# Patient Record
Sex: FEMALE | Race: WHITE
[De-identification: names, ages, dates, MRNs, and addresses within clinical notes are randomized per-mention and may not be internally consistent; named-entity substitution may affect disease eponyms.]

---

## 2020-04-24 ENCOUNTER — HOSPITAL ENCOUNTER (EMERGENCY)
Dept: HOSPITAL 97 - ER | Age: 40
Discharge: HOME | End: 2020-04-24
Payer: COMMERCIAL

## 2020-04-24 VITALS — TEMPERATURE: 98.4 F

## 2020-04-24 VITALS — SYSTOLIC BLOOD PRESSURE: 141 MMHG | DIASTOLIC BLOOD PRESSURE: 89 MMHG | OXYGEN SATURATION: 98 %

## 2020-04-24 DIAGNOSIS — I10: Primary | ICD-10-CM

## 2020-04-24 LAB
BUN BLD-MCNC: 13 MG/DL (ref 7–18)
GLUCOSE SERPLBLD-MCNC: 95 MG/DL (ref 74–106)
HCT VFR BLD CALC: 36.9 % (ref 36–45)
LYMPHOCYTES # SPEC AUTO: 1.9 K/UL (ref 0.7–4.9)
PMV BLD: 8.4 FL (ref 7.6–11.3)
POTASSIUM SERPL-SCNC: 3.3 MMOL/L (ref 3.5–5.1)
RBC # BLD: 4.65 M/UL (ref 3.86–4.86)

## 2020-04-24 PROCEDURE — 81025 URINE PREGNANCY TEST: CPT

## 2020-04-24 PROCEDURE — 93005 ELECTROCARDIOGRAM TRACING: CPT

## 2020-04-24 PROCEDURE — 85025 COMPLETE CBC W/AUTO DIFF WBC: CPT

## 2020-04-24 PROCEDURE — 99284 EMERGENCY DEPT VISIT MOD MDM: CPT

## 2020-04-24 PROCEDURE — 80048 BASIC METABOLIC PNL TOTAL CA: CPT

## 2020-04-24 PROCEDURE — 81003 URINALYSIS AUTO W/O SCOPE: CPT

## 2020-04-24 PROCEDURE — 36415 COLL VENOUS BLD VENIPUNCTURE: CPT

## 2020-04-24 NOTE — EDPHYS
Physician Documentation                                                                           

 [facility]                                                                 

Name: Nicolle Chaidez                                                                         

Age: 40 yrs                                                                                       

Sex: Female                                                                                       

: 1980                                                                                   

MRN: [id number]                                                                                   

Arrival Date: 2020                                                                          

Time: 13:18                                                                                       

Account#: [id number]                                                                            

Bed 17                                                                                            

Private MD: KYE ENCARNACION                                                                        

ED Physician Papa Cota                                                                         

HPI:                                                                                              

                                                                                             

14:18 This 40 yrs old  Female presents to ER via Ambulatory with complaints of High  jr8 

      Blood Pressure.                                                                             

14:18 The patient has elevated blood pressure and discovered this at work. Onset: The         jr8 

      symptoms/episode began/occurred acutely, today. Modifying factors: The symptoms are         

      alleviated by remaining still. Associated signs and symptoms: Pertinent positives:          

      headache. Severity of symptoms: At its worst the blood pressure was moderate, in the        

      emergency department the blood pressure is unchanged. The patient has not experienced       

      similar symptoms in the past. The patient has not recently seen a physician. Patient        

      stated that she was having headache that started yesterday. Different then previous         

      ones in past. Stated that she continued to have one today. While at work had BP checked     

      and was in the 180s. Came to ED for evaluation at that time .                               

                                                                                                  

Historical:                                                                                       

- Allergies:                                                                                      

13:35 No Known Allergies;                                                                     bp  

- Home Meds:                                                                                      

13:35 BIRTH CONTROL PILLS [Active];                                                           bp  

- PMHx:                                                                                           

13:35 PCOS; Anemia;                                                                           bp  

- PSHx:                                                                                           

13:35 ;                                                                              bp  

                                                                                                  

- Immunization history:: Adult Immunizations up to date.                                          

- Social history:: Smoking status: Patient denies any tobacco usage or history of.                

                                                                                                  

                                                                                                  

ROS:                                                                                              

14:18 Eyes: Negative for injury, pain, redness, and discharge, ENT: Negative for injury,      jr8 

      pain, and discharge, Neck: Negative for injury, pain, and swelling, Cardiovascular:         

      Negative for chest pain, palpitations, and edema, Respiratory: Negative for shortness       

      of breath, cough, wheezing, and pleuritic chest pain, Abdomen/GI: Negative for              

      abdominal pain, nausea, vomiting, diarrhea, and constipation, Back: Negative for injury     

      and pain, MS/Extremity: Negative for injury and deformity, Skin: Negative for injury,       

      rash, and discoloration.                                                                    

14:18 Neuro: Positive for headache.                                                               

                                                                                                  

Exam:                                                                                             

14:18 Eyes:  Pupils equal round and reactive to light, extra-ocular motions intact.  Lids and jr8 

      lashes normal.  Conjunctiva and sclera are non-icteric and not injected.  Cornea within     

      normal limits.  Periorbital areas with no swelling, redness, or edema. ENT:  Nares          

      patent. No nasal discharge, no septal abnormalities noted.  Tympanic membranes are          

      normal and external auditory canals are clear.  Oropharynx with no redness, swelling,       

      or masses, exudates, or evidence of obstruction, uvula midline.  Mucous membranes           

      moist. Neck:  Trachea midline, no thyromegaly or masses palpated, and no cervical           

      lymphadenopathy.  Supple, full range of motion without nuchal rigidity, or vertebral        

      point tenderness.  No Meningismus. Cardiovascular:  Regular rate and rhythm with a          

      normal S1 and S2.  No gallops, murmurs, or rubs.  Normal PMI, no JVD.  No pulse             

      deficits. Respiratory:  Lungs have equal breath sounds bilaterally, clear to                

      auscultation and percussion.  No rales, rhonchi or wheezes noted.  No increased work of     

      breathing, no retractions or nasal flaring. Abdomen/GI:  Soft, non-tender, with normal      

      bowel sounds.  No distension or tympany.  No guarding or rebound.  No evidence of           

      tenderness throughout. Back:  No spinal tenderness.  No costovertebral tenderness.          

      Full range of motion. Skin:  Warm, dry with normal turgor.  Normal color with no            

      rashes, no lesions, and no evidence of cellulitis. MS/ Extremity:  Pulses equal, no         

      cyanosis.  Neurovascular intact.  Full, normal range of motion. Neuro:  Awake and           

      alert, GCS 15, oriented to person, place, time, and situation.  Cranial nerves II-XII       

      grossly intact.  Motor strength 5/5 in all extremities.  Sensory grossly intact.            

      Cerebellar exam normal.  Normal gait.                                                       

14:18 ECG was reviewed by the Attending Physician.                                                

                                                                                                  

Vital Signs:                                                                                      

13:27  / 95 LA (auto/reg); Pulse 88; Resp 18; Temp 98.4(TE); Pulse Ox 98% on R/A;       jp3 

      Weight 92.53 kg (R); Height 5 ft. 7 in. (170.18 cm) (R); Pain 3/10;                         

14:30  / 93; Pulse 76; Resp 16; Pulse Ox 97% ;                                          bp  

15:05  / 89; Pulse 80; Resp 16; Pulse Ox 98% ;                                          bp  

13:27 Body Mass Index 31.95 (92.53 kg, 170.18 cm)                                             jp3 

                                                                                                  

MDM:                                                                                              

13:31 Patient medically screened.                                                             jr8 

14:58 Data reviewed: vital signs, nurses notes, lab test result(s), EKG, and as a result, I   jr8 

      will discharge patient. Data interpreted: Pulse oximetry: on room air is 98 %.              

      Interpretation: normal. Counseling: I had a detailed discussion with the patient and/or     

      guardian regarding: the historical points, exam findings, and any diagnostic results        

      supporting the discharge/admit diagnosis, lab results, the need for outpatient follow       

      up, a family practitioner, to return to the emergency department if symptoms worsen or      

      persist or if there are any questions or concerns that arise at home.                       

                                                                                                  

                                                                                             

14:10 Order name: CBC with Diff; Complete Time: 14:58                                         [facility] 

                                                                                             

14:10 Order name: Basic Metabolic Panel; Complete Time: 14:58                                 [facility] 

                                                                                             

13:43 Order name: EKG - Nurse/Tech; Complete Time: 14:21                                      [facility] 

                                                                                             

13:43 Order name: EKG; Complete Time: 13:44                                                   [facility] 

                                                                                             

14:16 Order name: Urine Dipstick--Ancillary (enter results); Complete Time: 14:36               

                                                                                             

14:16 Order name: Urine Pregnancy--Ancillary (enter results); Complete Time: 14:36            eb  

                                                                                             

13:43 Order name: Urine Pregnancy Test (obtain specimen); Complete Time: 14:21                8 

                                                                                             

13:43 Order name: Urine Dipstick-Ancillary (obtain specimen); Complete Time: 14:21             

                                                                                                  

EC:18 Rate is 77 beats/min. Rhythm is regular, Normal Sinus Rhythm. QRS Axis is Normal. [place]    jr8 

      interval is normal at 162 msec. QRS interval is normal at 90 msec. QT interval is           

      prolonged at 491 msec. No Q waves. T waves are Normal. No ST changes noted. Clinical        

      impression: No evidence of ischemia and QT Prolongation. Interpreted by me. Reviewed by     

      me.                                                                                         

                                                                                                  

Administered Medications:                                                                         

No medications were administered                                                                  

                                                                                                  

                                                                                                  

Disposition:                                                                                      

15:27 Co-signature as Attending Physician, Papa Cota DO I agree with the assessment and     ms3 

      plan of care. Attestation: The patient's history, exam findings, diagnostics, and a         

      summary of any interventions or procedures was reviewed in detail with Zurdo PURCELL.      

                                                                                                  

Disposition:                                                                                      

20 14:58 Discharged to Home. Impression: Essential (primary) hypertension.                  

- Condition is Stable.                                                                            

- Discharge Instructions: Hypertension.                                                           

- Prescriptions for Lisinopril 10 mg Oral Tablet - take 1 tablet by ORAL route once               

  daily; 20 tablet.                                                                               

- Medication Reconciliation Form, Thank You Letter, Antibiotic Education, Prescription            

  Opioid Use form.                                                                                

- Follow up: Private Physician; When: 5 - 6 days; Reason: Recheck today's complaints,             

  Continuance of care, Re-evaluation by your physician.                                           

- Problem is new.                                                                                 

- Symptoms have improved.                                                                         

                                                                                                  

                                                                                                  

                                                                                                  

Signatures:                                                                                       

Dispatcher [employer]                           EDMS                                                 

Zurdo Trammell PA PA   jr8                                                  

Elder Dutta, RN                      RN   bp                                                   

Papa Cota DO                        DO   ms3                                                  

                                                                                                  

Corrections: (The following items were deleted from the chart)                                    

15:07 14:58 2020 14:58 Discharged to Home. Impression: Essential (primary)              bp  

      hypertension. Condition is Stable. Forms are Medication Reconciliation Form, Thank You      

      Letter, Antibiotic Education, Prescription Opioid Use. Follow up: Private Physician;        

      When: 5 - 6 days; Reason: Recheck today's complaints, Continuance of care,                  

      Re-evaluation by your physician. Problem is new. Symptoms have improved. jr8                

                                                                                                  

**************************************************************************************************

## 2020-04-24 NOTE — ER
Nurse's Notes                                                                                     

 [facility]                                                                 

Name: Nicolle Chaidez                                                                         

Age: 40 yrs                                                                                       

Sex: Female                                                                                       

: 1980                                                                                   

MRN: [id number]                                                                                   

Arrival Date: 2020                                                                          

Time: 13:18                                                                                       

Account#: [id number]                                                                            

Bed 17                                                                                            

Private MD: KYE ENCARNACION                                                                        

Diagnosis: Essential (primary) hypertension                                                       

                                                                                                  

Presentation:                                                                                     

                                                                                             

13:27 Chief complaint: Patient states: HTN WITH NO H/O HTN. Coronavirus screen: Proceed with  bp  

      normal triage. Ebola Screen: No symptoms or risks identified at this time. Initial          

      Sepsis Screen: Does the patient meet any 2 criteria? No. Patient's initial sepsis           

      screen is negative. Does the patient have a suspected source of infection? No.              

      Patient's initial sepsis screen is negative. Risk Assessment: Do you want to hurt           

      yourself or someone else? Patient reports no desire to harm self or others. Onset of        

      symptoms was 2020.                                                                

13:27 Method Of Arrival: Ambulatory                                                           bp  

13:27 Acuity: HENRY 4                                                                           bp  

                                                                                                  

Triage Assessment:                                                                                

13:30 General: Appears in no apparent distress. comfortable, Behavior is cooperative,         bp  

      appropriate for age, anxious. Pain: Denies pain. EENT: No deficits noted. Neuro: No         

      deficits noted. Cardiovascular: No deficits noted. Respiratory: No deficits noted. GI:      

      No signs and/or symptoms were reported involving the gastrointestinal system. : No        

      signs and/or symptoms were reported regarding the genitourinary system. Derm: No            

      deficits noted. Musculoskeletal: No deficits noted.                                         

                                                                                                  

Historical:                                                                                       

- Allergies:                                                                                      

13:35 No Known Allergies;                                                                     bp  

- Home Meds:                                                                                      

13:35 BIRTH CONTROL PILLS [Active];                                                           bp  

- PMHx:                                                                                           

13:35 PCOS; Anemia;                                                                           bp  

- PSHx:                                                                                           

13:35 ;                                                                              bp  

                                                                                                  

- Immunization history:: Adult Immunizations up to date.                                          

- Social history:: Smoking status: Patient denies any tobacco usage or history of.                

                                                                                                  

                                                                                                  

Screenin:30 Abuse screen: Denies threats or abuse. Denies injuries from another. Nutritional        bp  

      screening: No deficits noted. Tuberculosis screening: No symptoms or risk factors           

      identified. Fall Risk None identified.                                                      

                                                                                                  

Assessment:                                                                                       

13:30 General: SEE TRIAGE NOTE.                                                               bp  

14:30 Reassessment: ALL CURRENT ORDERS COMPLETED. DISPO PENDING.                              bp  

15:04 Reassessment: PT D/C HOME AMBULATORY, DX WITH HTN.                                      bp  

                                                                                                  

Vital Signs:                                                                                      

13:27  / 95 LA (auto/reg); Pulse 88; Resp 18; Temp 98.4(TE); Pulse Ox 98% on R/A;       jp3 

      Weight 92.53 kg (R); Height 5 ft. 7 in. (170.18 cm) (R); Pain 3/10;                         

14:30  / 93; Pulse 76; Resp 16; Pulse Ox 97% ;                                          bp  

15:05  / 89; Pulse 80; Resp 16; Pulse Ox 98% ;                                          bp  

13:27 Body Mass Index 31.95 (92.53 kg, 170.18 cm)                                             jp3 

                                                                                                  

ED Course:                                                                                        

13:18 Patient arrived in ED.                                                                  am2 

13:18 KYE ENCARNACION is Private Physician.                                                    am2 

13:23 Elder Dutta, RN is Primary Nurse.                                                    bp  

13:24 Zurdo Trammell PA is PHCP.                                                               jr8 

13:24 Papa Cota DO is Attending Physician.                                                jr8 

13:28 Patient has correct armband on for positive identification. Bed in low position. Call   3 

      light in reach. Side rails up X 1. Door closed. Verbal reassurance given. Pulse ox on.      

      NIBP on.                                                                                    

13:28 Patient maintains SpO2 saturation greater than 95% on room air.                         jp3 

13:30 No provider procedures requiring assistance completed. Patient did not have IV access   bp  

      during this emergency room visit.                                                           

13:34 Triage completed.                                                                       bp  

14:00 Urine collected: clean catch specimen, clear, jesus colored.                            jp3 

14:10 EKG done, by EKG tech. reviewed by Zurdo PURCELL.                                      jp3 

14:15 Initial lab(s) drawn, by me, sent to lab.                                               jp3 

14:21 Basic Metabolic Panel Sent.                                                             jp3 

14:21 CBC with Diff Sent.                                                                     jp3 

15:06 Arm band placed on.                                                                     bp  

                                                                                                  

Administered Medications:                                                                         

No medications were administered                                                                  

                                                                                                  

                                                                                                  

Outcome:                                                                                          

14:58 Discharge ordered by MD.                                                                [id number] 

15:06 Discharged to home ambulatory.                                                          bp  

15:06 Condition: stable                                                                           

15:06 Discharge instructions given to patient, Instructed on discharge instructions, follow       

      up and referral plans. medication usage, Demonstrated understanding of instructions,        

      follow-up care, medications, Prescriptions given X 1.                                       

15:07 Patient left the ED.                                                                    bp  

                                                                                                  

Signatures:                                                                                       

Zurdo Trammell PA PA   jr8                                                  

Rosemary Navarro                               am2                                                  

Elder Dutta, RN                      RN   bp                                                   

Irving Kinsey                              jp3                                                  

                                                                                                  

**************************************************************************************************
